# Patient Record
Sex: FEMALE | Race: WHITE | NOT HISPANIC OR LATINO | Employment: OTHER | URBAN - METROPOLITAN AREA
[De-identification: names, ages, dates, MRNs, and addresses within clinical notes are randomized per-mention and may not be internally consistent; named-entity substitution may affect disease eponyms.]

---

## 2021-01-20 DIAGNOSIS — Z23 ENCOUNTER FOR IMMUNIZATION: ICD-10-CM

## 2021-12-08 ENCOUNTER — TELEPHONE (OUTPATIENT)
Dept: NEUROLOGY | Facility: CLINIC | Age: 81
End: 2021-12-08

## 2022-01-21 ENCOUNTER — TELEPHONE (OUTPATIENT)
Dept: NEUROLOGY | Facility: CLINIC | Age: 82
End: 2022-01-21

## 2022-01-25 ENCOUNTER — OFFICE VISIT (OUTPATIENT)
Dept: NEUROLOGY | Facility: CLINIC | Age: 82
End: 2022-01-25
Payer: COMMERCIAL

## 2022-01-25 VITALS
BODY MASS INDEX: 31.25 KG/M2 | HEART RATE: 88 BPM | TEMPERATURE: 97.2 F | HEIGHT: 63 IN | SYSTOLIC BLOOD PRESSURE: 142 MMHG | RESPIRATION RATE: 18 BRPM | OXYGEN SATURATION: 98 % | WEIGHT: 176.4 LBS | DIASTOLIC BLOOD PRESSURE: 88 MMHG

## 2022-01-25 DIAGNOSIS — R42 VERTIGO: Primary | ICD-10-CM

## 2022-01-25 DIAGNOSIS — R55 SYNCOPE: ICD-10-CM

## 2022-01-25 DIAGNOSIS — R93.0 ABNORMAL MRI OF HEAD: ICD-10-CM

## 2022-01-25 PROCEDURE — 99204 OFFICE O/P NEW MOD 45 MIN: CPT | Performed by: PSYCHIATRY & NEUROLOGY

## 2022-01-25 RX ORDER — CHLORAL HYDRATE 500 MG
1000 CAPSULE ORAL DAILY
COMMUNITY

## 2022-01-25 RX ORDER — FUROSEMIDE 20 MG/1
1 TABLET ORAL DAILY
COMMUNITY

## 2022-01-25 RX ORDER — ATORVASTATIN CALCIUM 20 MG/1
TABLET, FILM COATED ORAL EVERY 24 HOURS
COMMUNITY

## 2022-01-25 RX ORDER — LOSARTAN POTASSIUM 100 MG/1
TABLET ORAL EVERY 24 HOURS
COMMUNITY
End: 2022-01-25 | Stop reason: SDUPTHER

## 2022-01-25 RX ORDER — METOPROLOL TARTRATE AND HYDROCHLOROTHIAZIDE 100; 50 MG/1; MG/1
TABLET ORAL DAILY
COMMUNITY

## 2022-01-25 RX ORDER — VITAMIN B COMPLEX
1000 TABLET ORAL DAILY
COMMUNITY

## 2022-01-25 RX ORDER — CARVEDILOL 12.5 MG/1
12.5 TABLET ORAL EVERY 12 HOURS
COMMUNITY

## 2022-01-25 RX ORDER — LOSARTAN POTASSIUM 100 MG/1
100 TABLET ORAL DAILY
COMMUNITY
Start: 2021-11-17

## 2022-01-25 NOTE — ASSESSMENT & PLAN NOTE
Pt here for initial evaluation for episode of dizziness and syncope  Pt noted sxs in Oct and had 2 syncopal episodes while in bathroom on toiltet  Pt did not seek immediate medical attn  Pt did contact pcp and had express care work up  Pt with int episodes of dizziness  Prior history of vertigo  Strong positional component  No discrete pathology in posterior fossa on imaging  Rev imaging with pt in detail at appt  Non focal exam   rec pt to be seen by pcp and ent

## 2022-01-25 NOTE — PROGRESS NOTES
Patient ID: Rodney Nichole is a 80 y o  female  Assessment/Plan:    Vertigo  Pt here for initial evaluation for episode of dizziness and syncope  Pt noted sxs in Oct and had 2 syncopal episodes while in bathroom on toiltet  Pt did not seek immediate medical attn  Pt did contact pcp and had express care work up  Pt with int episodes of dizziness  Prior history of vertigo  Strong positional component  No discrete pathology in posterior fossa on imaging  Rev imaging with pt in detail at appt  Non focal exam   rec pt to be seen by pcp and ent    Syncope  No recurrence  Question vasovagal  Pt to follow up with pcp  No clear loc or sz    Abnormal MRI of head  As part of workup for the syncope and dizziness, pt had ct head and mri head due to abn heavily calcified extra axial lesion at the peripheral margin of the vase of the right middle cranial fossa which does not enhance  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  Pt to have further eval by neurosurgery to review films, differential and location       Diagnoses and all orders for this visit:    Vertigo    Syncope    Abnormal MRI of head    Other orders  -     atorvastatin (LIPITOR) 20 mg tablet; every 24 hours  -     carvedilol (COREG) 12 5 mg tablet; Take 12 5 mg by mouth Every 12 hours    -     Omega-3 Fatty Acids (fish oil) 1,000 mg; Take 1,000 mg by mouth daily    -     furosemide (LASIX) 20 mg tablet; Take 1 tablet by mouth daily  -     Discontinue: losartan (COZAAR) 100 MG tablet; every 24 hours  -     metoprolol-hydrochlorothiazide (LOPRESSOR HCT) 100-50 MG per tablet; Take by mouth daily    -     cholecalciferol (VITAMIN D3) 25 mcg (1,000 units) tablet; Take 1,000 Units by mouth daily    -     losartan (COZAAR) 100 MG tablet; Take 100 mg by mouth daily             Subjective:    HPI    Pt is a 81 yo f with pmh of htn hypercholesterolemia who presents today for evaluation of abnormal mri head   Pt sent by pcp for further evaluation  Pt notes sxs starting in oct 2021 with 2 back to back syndopal episodes while in bathroom on toilet  Pt recalls feeling little dizzy upon getting up from bathroom, and then thinking maybe had a quick syncopal episode, felt woozy again upon getting up second time and had a brief syncope  Pt does not think hit head  Went over to one side  Pt notes no loss of control of bowel or bladder  No tongue biting  Pt did not seek immediate attention  Pt did see express care at pcp office and had ekg done per pt  Pt notes history of vertigo for years before  Pt also had int dizziness on prior months to the syncopal episodes  Pt notes no other associated sxs  No falls or trips, no loc  No sz  No change in vision  No loss of vision  No change in bowel or bladder  No change in speech or swallowing  Pt notes a specific positional component to the dizziness ie getting up from bed, chair, turning head  Pt had imaging done as part of the dizziness workup  Rev in detail with pt and daughter at appt  Pt had ct head first on 11/3/21 with notation of 1 6 by 0 9 by 1 3 cm extra axial dural based calcified lesion in the right middle cranial fossa  Mild ventricular prominence, appropriate for degree of atrophy  No acute intracranial process  Pt had mri head done as follow up with and without contrast   Study done on nov 10 2021  mri head with  abn heavily calcified extra axial lesion at the peripheral margin of the vase of the right middle cranial fossa which does not enhance  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  Rev further need for input from neurosurgery  to review films, differential and location  Pt and family in agreement with plan  Presently no new sxs  No active sxs  Pt recommended to follow up with pcp for the syncopal episodes  No recurrence to date        The following portions of the patient's history were reviewed and updated as appropriate: allergies, current medications, past family history, past medical history, past social history, past surgical history and problem list and med rec and ros rev  Objective:    Blood pressure 142/88, pulse 88, temperature (!) 97 2 °F (36 2 °C), temperature source Tympanic, resp  rate 18, height 5' 3" (1 6 m), weight 80 kg (176 lb 6 4 oz), SpO2 98 %  Physical Exam  Constitutional:       General: She is not in acute distress  Appearance: She is not ill-appearing  Eyes:      General: Lids are normal       Extraocular Movements: Extraocular movements intact  Pupils: Pupils are equal, round, and reactive to light  Musculoskeletal:      Right lower leg: No edema  Left lower leg: No edema  Neurological:      Deep Tendon Reflexes: Strength normal and reflexes are normal and symmetric  Psychiatric:         Speech: Speech normal          Neurological Exam  Mental Status  Awake, alert and oriented to person, place and time  Recent and remote memory are intact  Speech is normal  Language is fluent with no aphasia  Attention and concentration are normal  Fund of knowledge is appropriate for level of education  Cranial Nerves  CN II: Visual acuity is normal  Visual fields full to confrontation  CN III, IV, VI: Extraocular movements intact bilaterally  Normal lids and orbits bilaterally  Pupils equal round and reactive to light bilaterally  CN V: Facial sensation is normal   CN VII: Full and symmetric facial movement  CN VIII: Hearing is normal   CN IX, X: Palate elevates symmetrically  Normal gag reflex  CN XI: Shoulder shrug strength is normal   CN XII: Tongue midline without atrophy or fasciculations  Motor  Normal muscle bulk throughout  Normal muscle tone  No abnormal involuntary movements  Strength is 5/5 throughout all four extremities      Sensory  Sensation is intact to light touch, pinprick, vibration and proprioception in all four extremities  Reflexes  Deep tendon reflexes are 2+ and symmetric in all four extremities with downgoing toes bilaterally  Coordination  Right: Finger-to-nose normal  Rapid alternating movement normal   Left: Finger-to-nose normal  Heel-to-shin normal     Gait  Casual gait is normal including stance, stride, and arm swing  ROS:    Review of Systems   Constitutional: Negative  Negative for appetite change and fever  HENT: Negative  Negative for hearing loss, tinnitus, trouble swallowing and voice change  Eyes: Negative  Negative for photophobia and pain  Respiratory: Negative  Negative for shortness of breath  Cardiovascular: Negative  Negative for palpitations  Gastrointestinal: Negative  Negative for nausea and vomiting  Endocrine: Negative  Negative for cold intolerance  Genitourinary: Positive for frequency and urgency  Negative for dysuria  Musculoskeletal: Positive for gait problem (balance issues)  Negative for myalgias and neck pain  Skin: Negative  Negative for rash  Allergic/Immunologic: Negative  Neurological: Positive for dizziness (at times), syncope (last time she passed out was in october 2021) and light-headedness (at times)  Negative for tremors, seizures, facial asymmetry, speech difficulty, weakness, numbness and headaches  Hematological: Negative  Does not bruise/bleed easily  Psychiatric/Behavioral: Negative  Negative for confusion, hallucinations and sleep disturbance

## 2022-01-25 NOTE — ASSESSMENT & PLAN NOTE
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

## 2022-02-16 ENCOUNTER — OFFICE VISIT (OUTPATIENT)
Dept: NEUROSURGERY | Facility: CLINIC | Age: 82
End: 2022-02-16
Payer: COMMERCIAL

## 2022-02-16 VITALS
TEMPERATURE: 97.5 F | HEART RATE: 75 BPM | BODY MASS INDEX: 31.18 KG/M2 | HEIGHT: 63 IN | SYSTOLIC BLOOD PRESSURE: 124 MMHG | DIASTOLIC BLOOD PRESSURE: 84 MMHG | WEIGHT: 176 LBS | RESPIRATION RATE: 20 BRPM

## 2022-02-16 DIAGNOSIS — D32.9 MENINGIOMA (HCC): ICD-10-CM

## 2022-02-16 PROCEDURE — 99203 OFFICE O/P NEW LOW 30 MIN: CPT | Performed by: NEUROLOGICAL SURGERY

## 2022-02-16 NOTE — PROGRESS NOTES
Neurosurgery Office Note  Erick Obrien 80 y o  female MRN: [id number]      Assessment/Plan      Patient is a 80years old pleasant lady, with past medical history of hypertension, vertigo and syncopal episode 10 years ago, and dizziness last October 2021  She is  here today with her daughter,  referred by her Dr Nikki Rhodes ( neurologist) after MRI demonstrates heavily calcified extra-axial lesion at the peripheral margin of base of right middle cranial fossa  Patient reports occasional dizziness otherwise denies any syncopal episode, headache, vision issues, seizures, weakness in the extremities, speech or swallowing problem or bowel/ bladder dysfunction  Exam-A&Ox3, PERRL, EOMI 2 mm conj bilaterally, SF/Bob  Finger-to-nose tests normal and without drift bilaterally  Strength is 5/5  Sensation to light touch intact throughout  DTR 2+ without clonus bilaterally  Stable gait  MRI of brain with and without contrast on 11/10/2021 demonstrate heavily calcified extra-axial lesion at the peripheral margin of base of right middle cranial fossa does not enhance  Differential diagnosis is an 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 versus heavily calcified nonenhancing meningioma  Bulan of enhancement is compatible with benign    Hx, PEx and images reviewed with the patient and her daughter  Mx plan discussed  Dr Nell Ha reviewed the images  Dosing her dizziness is related to the bony lesion  Advised to follow-up with her PCP or neurology for her dizziness  Otherwise,  from Neurosurgery perspective, lesion appears despite benign growth and has no significant mass effect on the surrounding brain tissue, so recommend no procedure or regular follow-up is required  Patient can call office or go to emergency room with new neurological symptoms  Questions and concerns were answered  Both patient and her daughter understands instructions and agreed with the plan  Plan:  1   From Neurosurgery perspective, no procedure or regular follow-up imaging  is required  2  Patient was advised to call office or go to emergency room with new neurological symptoms  3  Otherwise follow-up on p r n  basis         C/C: " Referred by her Neurologist for abnormal brain images"      History of Present Illness     80y o  year old female with past medical history of hypertension, vertigo and syncopal episode 10 years ago, and dizziness last October any 2021  Patient is referred by her neurologist after brain images demonstrate abnormal lesion  Currently, patient does not have significant neurological symptoms but reports occasional bifrontal headache and dizziness  Denies any visual issues, speech or swallowing problem, seizures, weakness in the extremities, bowel or bladder dysfunction  Denies gait instability or tendency to fall  Denies history of fever, chills, rigors, cough or chest pain  Denies history of diabetes mellitus, congestive heart failure, stroke, seizures, bleeding disorder or taking anticoagulant medications    Patient denies history of smoking cigarettes or drinking alcohol  Image findings as described in the assessment section above  REVIEW OF SYSTEMS  Review of system personally reviewed and updated  Review of Systems   Constitutional: Negative  HENT: Negative  Eyes: Positive for visual disturbance (blurred vision at times at nihgt)  Cardiovascular: Negative  H/o HTN/HYperlipidemia   Gastrointestinal: Negative  Endocrine: Negative  Genitourinary: Negative  Musculoskeletal: Positive for gait problem (unsteady on her feet at times)  Skin: Negative  Allergic/Immunologic: Negative  Neurological: Positive for dizziness (x 4month), syncope and headaches (2x a week Tylenol PRN -- Good Relief)  Hematological: Negative  Psychiatric/Behavioral: Negative  All other systems reviewed and are negative          Meds/Allergies     Current Outpatient Medications Medication Sig Dispense Refill    atorvastatin (LIPITOR) 20 mg tablet every 24 hours      carvedilol (COREG) 12 5 mg tablet Take 12 5 mg by mouth Every 12 hours        cholecalciferol (VITAMIN D3) 25 mcg (1,000 units) tablet Take 1,000 Units by mouth daily        furosemide (LASIX) 20 mg tablet Take 1 tablet by mouth daily      losartan (COZAAR) 100 MG tablet Take 100 mg by mouth daily        metoprolol-hydrochlorothiazide (LOPRESSOR HCT) 100-50 MG per tablet Take by mouth daily        Omega-3 Fatty Acids (fish oil) 1,000 mg Take 1,000 mg by mouth daily         No current facility-administered medications for this visit  No Known Allergies    PAST HISTORY    Past Medical History:   Diagnosis Date    High cholesterol     Hypertension        Past Surgical History:   Procedure Laterality Date    CATARACT EXTRACTION, BILATERAL  2010    CHOLECYSTECTOMY N/A 1995    KNEE SURGERY N/A 1990    torn meniscus       Social History     Tobacco Use    Smoking status: Former Smoker    Smokeless tobacco: Former User   Substance Use Topics    Alcohol use: Never    Drug use: Never       Family History   Problem Relation Age of Onset    Heart disease Mother     Lung cancer Father     Diabetes Father     Diabetes Paternal Grandmother     Heart disease Paternal Grandfather          Above history personally reviewed  EXAM    Vitals: There were no vitals taken for this visit  ,There is no height or weight on file to calculate BMI  Physical Exam  Constitutional:       Appearance: Normal appearance  HENT:      Head: Normocephalic and atraumatic  Eyes:      Extraocular Movements: Extraocular movements intact  Pupils: Pupils are equal, round, and reactive to light  Cardiovascular:      Rate and Rhythm: Normal rate and regular rhythm  Pulmonary:      Effort: Pulmonary effort is normal    Musculoskeletal:         General: Normal range of motion  Cervical back: Normal range of motion  Neurological:      General: No focal deficit present  Mental Status: She is alert and oriented to person, place, and time  GCS: GCS eye subscore is 4  GCS verbal subscore is 5  GCS motor subscore is 6  Cranial Nerves: Cranial nerves are intact  Sensory: Sensation is intact  Motor: Motor function is intact  Coordination: Finger-Nose-Finger Test normal       Deep Tendon Reflexes: Reflexes are normal and symmetric  Reflex Scores:       Tricep reflexes are 2+ on the right side and 2+ on the left side  Bicep reflexes are 2+ on the right side and 2+ on the left side  Brachioradialis reflexes are 2+ on the right side and 2+ on the left side  Patellar reflexes are 2+ on the right side and 2+ on the left side  Achilles reflexes are 2+ on the right side and 2+ on the left side  Psychiatric:         Speech: Speech normal          Neurologic Exam     Mental Status   Oriented to person, place, and time  Speech: speech is normal   Level of consciousness: alert    Cranial Nerves     CN III, IV, VI   Pupils are equal, round, and reactive to light  Right pupil: Size: 2 mm  Shape: regular  Reactivity: brisk  Left pupil: Size: 2 mm  Shape: regular  Reactivity: brisk     Nystagmus: none     CN XI   CN XI normal      Motor Exam   Muscle bulk: normal  Overall muscle tone: normal  Right arm tone: normal  Left arm tone: normal  Right arm pronator drift: absent  Left arm pronator drift: absent  Right leg tone: normal  Left leg tone: normal    Sensory Exam   Light touch normal      Gait, Coordination, and Reflexes     Coordination   Finger to nose coordination: normal    Reflexes   Right brachioradialis: 2+  Left brachioradialis: 2+  Right biceps: 2+  Left biceps: 2+  Right triceps: 2+  Left triceps: 2+  Right patellar: 2+  Left patellar: 2+  Right achilles: 2+  Left achilles: 2+  Right : 2+  Left : 2+  Right Goel: absent  Left Goel: absent  Right ankle clonus: absent  Left pendular knee jerk: absent        MEDICAL DECISION MAKING    Imaging Studies:     No results found      I have personally reviewed pertinent reports   , I have personally reviewed pertinent films in PACS and I have personally reviewed pertinent films in PACS with a Radiologist

## 2025-07-21 ENCOUNTER — ANESTHESIA (OUTPATIENT)
Age: 85
End: 2025-07-21

## 2025-07-21 ENCOUNTER — ANESTHESIA EVENT (OUTPATIENT)
Age: 85
End: 2025-07-21